# Patient Record
Sex: FEMALE | URBAN - METROPOLITAN AREA
[De-identification: names, ages, dates, MRNs, and addresses within clinical notes are randomized per-mention and may not be internally consistent; named-entity substitution may affect disease eponyms.]

---

## 2019-12-19 ENCOUNTER — TELEPHONE (OUTPATIENT)
Dept: TRANSPLANT | Facility: CLINIC | Age: 29
End: 2019-12-19

## 2019-12-19 NOTE — TELEPHONE ENCOUNTER
Day Tan called and stated that she is interested in becoming a living donor for her friend, Aden Franklin.  Medical and social history obtained.  No contraindications noted.  All questions answered.  Kidney donor information book emailed to patient for review. Instructed to contact me with questions or to schedule testing. Patient verbalized understanding.

## 2020-05-25 ENCOUNTER — DOCUMENTATION ONLY (OUTPATIENT)
Dept: TRANSPLANT | Facility: CLINIC | Age: 30
End: 2020-05-25